# Patient Record
Sex: FEMALE | Race: WHITE | NOT HISPANIC OR LATINO | URBAN - METROPOLITAN AREA
[De-identification: names, ages, dates, MRNs, and addresses within clinical notes are randomized per-mention and may not be internally consistent; named-entity substitution may affect disease eponyms.]

---

## 2017-02-09 ENCOUNTER — IMPORTED ENCOUNTER (OUTPATIENT)
Dept: URBAN - METROPOLITAN AREA CLINIC 31 | Facility: CLINIC | Age: 67
End: 2017-02-09

## 2017-02-09 PROBLEM — H17.89: Noted: 2017-02-09

## 2017-02-09 PROBLEM — Z96.1: Noted: 2017-02-09

## 2017-02-09 PROBLEM — H16.223: Noted: 2017-02-09

## 2017-02-09 PROBLEM — H04.123: Noted: 2017-02-09

## 2017-02-09 PROBLEM — H35.3222: Noted: 2017-02-09

## 2017-02-09 PROBLEM — H26.491: Noted: 2017-02-09

## 2017-02-09 PROCEDURE — 92014 COMPRE OPH EXAM EST PT 1/>: CPT

## 2017-03-02 ENCOUNTER — IMPORTED ENCOUNTER (OUTPATIENT)
Dept: URBAN - METROPOLITAN AREA CLINIC 31 | Facility: CLINIC | Age: 67
End: 2017-03-02

## 2017-03-02 PROBLEM — H35.3222: Noted: 2017-03-02

## 2017-03-02 PROBLEM — H16.223: Noted: 2017-03-02

## 2017-03-02 PROBLEM — H26.491: Noted: 2017-03-02

## 2017-03-02 PROBLEM — Z96.1: Noted: 2017-03-02

## 2017-03-02 PROBLEM — H17.89: Noted: 2017-03-02

## 2017-03-02 PROCEDURE — 99213 OFFICE O/P EST LOW 20 MIN: CPT

## 2017-03-02 PROCEDURE — 92025 CPTRIZED CORNEAL TOPOGRAPHY: CPT

## 2017-03-28 ENCOUNTER — IMPORTED ENCOUNTER (OUTPATIENT)
Dept: URBAN - METROPOLITAN AREA CLINIC 31 | Facility: CLINIC | Age: 67
End: 2017-03-28

## 2017-03-28 PROBLEM — Z96.1: Noted: 2017-03-28

## 2017-03-28 PROBLEM — H17.89: Noted: 2017-03-28

## 2017-03-28 PROBLEM — H52.10: Noted: 2017-03-28

## 2017-03-28 PROBLEM — H35.3222: Noted: 2017-03-28

## 2017-03-28 PROBLEM — H35.3112: Noted: 2017-03-28

## 2017-04-21 ENCOUNTER — IMPORTED ENCOUNTER (OUTPATIENT)
Dept: URBAN - METROPOLITAN AREA CLINIC 31 | Facility: CLINIC | Age: 67
End: 2017-04-21

## 2017-04-21 PROCEDURE — 66999 UNLISTED PX ANT SEGMENT EYE: CPT

## 2017-04-22 ENCOUNTER — IMPORTED ENCOUNTER (OUTPATIENT)
Dept: URBAN - METROPOLITAN AREA CLINIC 31 | Facility: CLINIC | Age: 67
End: 2017-04-22

## 2017-04-22 PROBLEM — H52.10: Noted: 2017-04-22

## 2017-04-22 PROCEDURE — 99024 POSTOP FOLLOW-UP VISIT: CPT

## 2017-04-27 ENCOUNTER — IMPORTED ENCOUNTER (OUTPATIENT)
Dept: URBAN - METROPOLITAN AREA CLINIC 31 | Facility: CLINIC | Age: 67
End: 2017-04-27

## 2017-04-27 PROCEDURE — 99024 POSTOP FOLLOW-UP VISIT: CPT

## 2017-05-11 ENCOUNTER — IMPORTED ENCOUNTER (OUTPATIENT)
Dept: URBAN - METROPOLITAN AREA CLINIC 31 | Facility: CLINIC | Age: 67
End: 2017-05-11

## 2017-05-11 PROCEDURE — 99024 POSTOP FOLLOW-UP VISIT: CPT

## 2017-06-15 ENCOUNTER — IMPORTED ENCOUNTER (OUTPATIENT)
Dept: URBAN - METROPOLITAN AREA CLINIC 31 | Facility: CLINIC | Age: 67
End: 2017-06-15

## 2017-06-15 PROBLEM — Z96.1: Noted: 2017-06-15

## 2017-06-15 PROCEDURE — 99024 POSTOP FOLLOW-UP VISIT: CPT

## 2017-07-11 ENCOUNTER — FOLLOW UP (OUTPATIENT)
Dept: URBAN - METROPOLITAN AREA CLINIC 33 | Facility: CLINIC | Age: 67
End: 2017-07-11

## 2017-07-11 VITALS
HEART RATE: 78 BPM | HEIGHT: 61 IN | BODY MASS INDEX: 26.06 KG/M2 | WEIGHT: 138 LBS | DIASTOLIC BLOOD PRESSURE: 88 MMHG | SYSTOLIC BLOOD PRESSURE: 126 MMHG

## 2017-07-11 DIAGNOSIS — H35.3111: ICD-10-CM

## 2017-07-11 DIAGNOSIS — H43.813: ICD-10-CM

## 2017-07-11 DIAGNOSIS — H04.123: ICD-10-CM

## 2017-07-11 DIAGNOSIS — H35.3223: ICD-10-CM

## 2017-07-11 PROCEDURE — 92250 FUNDUS PHOTOGRAPHY W/I&R: CPT

## 2017-07-11 PROCEDURE — G8417 CALC BMI ABV UP PARAM F/U: HCPCS

## 2017-07-11 PROCEDURE — 2019F DILATED MACUL EXAM DONE: CPT

## 2017-07-11 PROCEDURE — 4177F TALK PT/CRGVR RE AREDS PREV: CPT

## 2017-07-11 PROCEDURE — 1036F TOBACCO NON-USER: CPT

## 2017-07-11 PROCEDURE — 92014 COMPRE OPH EXAM EST PT 1/>: CPT

## 2017-07-11 PROCEDURE — 92235 FLUORESCEIN ANGRPH MLTIFRAME: CPT

## 2017-07-11 PROCEDURE — G8427 DOCREV CUR MEDS BY ELIG CLIN: HCPCS

## 2017-07-11 ASSESSMENT — VISUAL ACUITY
OS_SC: 20/25+2
OD_SC: 20/40-2
OD_PH: 20/30+2

## 2017-07-11 ASSESSMENT — TONOMETRY
OD_IOP_MMHG: 12
OS_IOP_MMHG: 13

## 2017-07-20 ENCOUNTER — IMPORTED ENCOUNTER (OUTPATIENT)
Dept: URBAN - METROPOLITAN AREA CLINIC 31 | Facility: CLINIC | Age: 67
End: 2017-07-20

## 2017-07-20 PROCEDURE — 99024 POSTOP FOLLOW-UP VISIT: CPT

## 2017-09-25 NOTE — PATIENT DISCUSSION
Surgery  Counseling: I have discussed the option of glasses versus cataract surgery versus following. It was explained that when vision no longer meets the patient?s visual needs and a new prescription for glasses is not likely to improve the patient?s visual symptoms, the option of cataract surgery is a reasonable next step. It was explained that there is no guarantee that removing the cataract will improve their visual symptoms, however; it is believed that the cataract is contributing to the patient's visual impairment and surgery may significantly improve both the visual and functional status of the patient. The risks, benefits and alternatives of surgery were discussed with the patient. After this discussion, the patient desires to proceed with cataract surgery with implantation of an intraocular lens to improve vision drive safely at night with less glare, to read small print and doing most up close work.

## 2017-09-25 NOTE — PATIENT DISCUSSION
GLAUCOMA SUSPECT, OU ? OPTIC NERVE CUPPING/ASYMMETRY. IOP BORDERLINE, OU. OCT NEXT VISIT TO CHECK FOR RNFL THINNING. VISUAL FIELD NEXT VISIT TO CHECK FOR VISUAL FIELD LOSS.

## 2017-09-25 NOTE — PATIENT DISCUSSION
"Surgery Drops: I have given the patient the option to chose whether they would like a prescribed regimen of drops or an all-in-one drop for use before and after cataract surgery. They have elected to use the all-in-one option of Pred-Gati-Brom (prednisolone acetate, gatifloxacin and bromfenac). Patient to administer ""as directed"". "

## 2017-10-19 ENCOUNTER — IMPORTED ENCOUNTER (OUTPATIENT)
Dept: URBAN - METROPOLITAN AREA CLINIC 31 | Facility: CLINIC | Age: 67
End: 2017-10-19

## 2017-10-19 PROCEDURE — 99024 POSTOP FOLLOW-UP VISIT: CPT

## 2018-07-11 ENCOUNTER — FOLLOW UP (OUTPATIENT)
Dept: URBAN - METROPOLITAN AREA CLINIC 33 | Facility: CLINIC | Age: 68
End: 2018-07-11

## 2018-07-11 VITALS
HEART RATE: 64 BPM | SYSTOLIC BLOOD PRESSURE: 122 MMHG | DIASTOLIC BLOOD PRESSURE: 72 MMHG | BODY MASS INDEX: 27.09 KG/M2 | WEIGHT: 138 LBS | HEIGHT: 60 IN

## 2018-07-11 DIAGNOSIS — H43.813: ICD-10-CM

## 2018-07-11 DIAGNOSIS — H35.3223: ICD-10-CM

## 2018-07-11 DIAGNOSIS — H35.3111: ICD-10-CM

## 2018-07-11 PROCEDURE — 92250 FUNDUS PHOTOGRAPHY W/I&R: CPT

## 2018-07-11 PROCEDURE — 92014 COMPRE OPH EXAM EST PT 1/>: CPT

## 2018-07-11 PROCEDURE — 92235 FLUORESCEIN ANGRPH MLTIFRAME: CPT

## 2018-07-11 ASSESSMENT — VISUAL ACUITY
OD_SC: 20/30
OD_PH: 20/30+2
OS_SC: 20/25

## 2018-07-11 ASSESSMENT — TONOMETRY
OD_IOP_MMHG: 11
OS_IOP_MMHG: 10

## 2018-08-30 ENCOUNTER — IMPORTED ENCOUNTER (OUTPATIENT)
Dept: URBAN - METROPOLITAN AREA CLINIC 31 | Facility: CLINIC | Age: 68
End: 2018-08-30

## 2018-08-30 PROBLEM — Z96.1: Noted: 2018-08-30

## 2018-08-30 PROBLEM — H17.89: Noted: 2018-08-30

## 2018-08-30 PROBLEM — H43.812: Noted: 2018-08-30

## 2018-08-30 PROCEDURE — 92014 COMPRE OPH EXAM EST PT 1/>: CPT

## 2019-07-15 ENCOUNTER — FOLLOW UP (OUTPATIENT)
Dept: URBAN - METROPOLITAN AREA CLINIC 33 | Facility: CLINIC | Age: 69
End: 2019-07-15

## 2019-07-15 VITALS
BODY MASS INDEX: 26.06 KG/M2 | WEIGHT: 138 LBS | DIASTOLIC BLOOD PRESSURE: 80 MMHG | HEART RATE: 69 BPM | SYSTOLIC BLOOD PRESSURE: 129 MMHG | HEIGHT: 61 IN

## 2019-07-15 DIAGNOSIS — H35.3111: ICD-10-CM

## 2019-07-15 DIAGNOSIS — H43.813: ICD-10-CM

## 2019-07-15 DIAGNOSIS — H35.3223: ICD-10-CM

## 2019-07-15 PROCEDURE — 92250 FUNDUS PHOTOGRAPHY W/I&R: CPT

## 2019-07-15 PROCEDURE — 92235 FLUORESCEIN ANGRPH MLTIFRAME: CPT

## 2019-07-15 PROCEDURE — 92014 COMPRE OPH EXAM EST PT 1/>: CPT

## 2019-07-15 ASSESSMENT — TONOMETRY
OD_IOP_MMHG: 14
OS_IOP_MMHG: 12

## 2019-07-15 ASSESSMENT — VISUAL ACUITY
OD_SC: 20/20-1
OS_SC: 20/20-2

## 2019-07-25 ENCOUNTER — IMPORTED ENCOUNTER (OUTPATIENT)
Dept: URBAN - METROPOLITAN AREA CLINIC 31 | Facility: CLINIC | Age: 69
End: 2019-07-25

## 2019-07-25 PROBLEM — Z96.1: Noted: 2019-07-25

## 2019-07-25 PROBLEM — H35.3232: Noted: 2019-07-25

## 2019-07-25 PROCEDURE — 92014 COMPRE OPH EXAM EST PT 1/>: CPT

## 2019-10-15 NOTE — PATIENT DISCUSSION
Surgery Counseling: I have discussed the option of glasses versus cataract surgery versus following. It was explained that when the patients vision no longer meets their visual needs and a glasses prescription does not improve visual symptoms, the option of cataract surgery is a reasonable next step. It was explained that there is no guarantee that removing the cataract will improve their visual symptoms, however; it is believed that the cataract is contributing to the patient's visual impairment and surgery may improve both the visual and functional status of the patient. The risks, benefits and alternatives of surgery were discussed with the patient. After this discussion, the patient desires to proceed with cataract surgery with implantation of an intraocular lens to improve vision and reduce glare.

## 2019-10-15 NOTE — PATIENT DISCUSSION
"Multifocal, Trifocal and Extended-depth-of-focus lens (EDOF) - It was explained that multifocal/trifocal and extended depth of focus lenses split light and this can be associated with a decrease in contrast sensitivity, depth of focus, a double image and ghosting which may or may not resolve over time. Multifocal/trifocal lenses are lighting dependent. In low or dim lighting, glasses may be needed for optimal near vision. It was explained that rings, halos, starbursts or spider webs around point sources of light (headlights, tail lights, street lights, neon signs, the moon, stars, etc.) will be present indefinitely after multifocal/trifocal/EDOF lens implantation. While the majority of patients do not find this limit's their night time activities to include driving, in rare instances, it can.  It was explained that these lenses are designed to satisfy a defined area of near vision, or ""sweet spot""

## 2019-10-15 NOTE — PATIENT DISCUSSION
Refractive Error - a problem focusing light accurately onto the retina due to the shape of the eye. Presbyopia is a gradual, age-related loss of the eye's ability to focus actively on nearby objects. The most common types of refractive error are near-sightedness; far-sightedness, astigmatism, and presbyopia. I have discussed the options for refractive surgery to decrease dependency on glasses and/or contact lenses after surgery.

## 2019-10-15 NOTE — PATIENT DISCUSSION
**For patient's undergoing cataract surgery- In the event you decline a refractive package, traditional cataract surgery will be performed and a monofocal IOL will be implanted. Patient understands that they will need glasses for distance, intermediate and near vision.

## 2019-10-15 NOTE — PATIENT DISCUSSION
It was discussed that while the success rate of cataract surgery is high (success being defined as removing a lens and replacing it with an artificial lens) the success rate of getting the prescription corrected to meet or approximate the patients goals is lower than it is with LASIK (about 75-80%). These options include: correction of astigmatism with limbal relaxing incision(s), LenSx arcuate incision(s) and/or a toric IOL, monovision, nanovision, multifocal/trifocal IOL, and EDOF IOL. The patient was advised that if refractive cataract surgery does not meet their expectations and glasses provide an increased quality of vision, glasses would be prescribed and if the patient is a candidate, a LASIK touch up may be offered. However, if the lenses are limiting their vision or activities and the vision is neither correctable nor satisfactory with glasses, then an IOL exchange may be considered.

## 2019-10-15 NOTE — PATIENT DISCUSSION
I have discussed the options of a multifocal/trifocal and extended-depth-of-focus lens. It was explained that these options provide vision at either distance/intermediate or distance/near or distance/intermediate/near; however, compromises to achieve vision at varying distances do exist and these compromises may result in a reduced quality of vision. It was explained to the patient that the visual performance and dependence on glasses varies from patient to patient and while some may perform activities without glasses there are others who need glasses for most or all of their activities. It was emphasized to the patient that multifocal/trifocal and EDOF options do not eliminate the need for glasses at all times. It was emphasized to the patient that the goal of refractive cataract surgery is reducing spectacle dependence not complete spectacle freedom.

## 2019-10-30 NOTE — PATIENT DISCUSSION
***This patient had traditiona cataract surgery performed. A monofocal IOL was placed to achieve a target refraction of plano (which should provide them with satisfactory distance vision with the aid of glasses/contact lenses). ***

## 2020-07-13 ENCOUNTER — FOLLOW UP (OUTPATIENT)
Dept: URBAN - METROPOLITAN AREA CLINIC 33 | Facility: CLINIC | Age: 70
End: 2020-07-13

## 2020-07-13 VITALS
WEIGHT: 132 LBS | HEIGHT: 61 IN | SYSTOLIC BLOOD PRESSURE: 116 MMHG | HEART RATE: 68 BPM | DIASTOLIC BLOOD PRESSURE: 83 MMHG | BODY MASS INDEX: 24.92 KG/M2

## 2020-07-13 DIAGNOSIS — H35.3111: ICD-10-CM

## 2020-07-13 DIAGNOSIS — H35.3223: ICD-10-CM

## 2020-07-13 DIAGNOSIS — H43.813: ICD-10-CM

## 2020-07-13 PROCEDURE — 92235 FLUORESCEIN ANGRPH MLTIFRAME: CPT

## 2020-07-13 PROCEDURE — 92014 COMPRE OPH EXAM EST PT 1/>: CPT

## 2020-07-13 PROCEDURE — 92250 FUNDUS PHOTOGRAPHY W/I&R: CPT

## 2020-07-13 ASSESSMENT — TONOMETRY
OD_IOP_MMHG: 10
OS_IOP_MMHG: 12

## 2020-07-13 ASSESSMENT — VISUAL ACUITY
OS_SC: 20/20+1
OD_SC: 20/20

## 2020-12-03 ENCOUNTER — IMPORTED ENCOUNTER (OUTPATIENT)
Dept: URBAN - METROPOLITAN AREA CLINIC 31 | Facility: CLINIC | Age: 70
End: 2020-12-03

## 2020-12-03 PROBLEM — H04.123: Noted: 2020-12-03

## 2020-12-03 PROBLEM — H35.3222: Noted: 2020-12-03

## 2020-12-03 PROBLEM — Z96.1: Noted: 2020-12-03

## 2020-12-03 PROCEDURE — 92014 COMPRE OPH EXAM EST PT 1/>: CPT

## 2021-11-18 ENCOUNTER — IMPORTED ENCOUNTER (OUTPATIENT)
Dept: URBAN - METROPOLITAN AREA CLINIC 31 | Facility: CLINIC | Age: 71
End: 2021-11-18

## 2021-11-18 PROBLEM — Z96.1: Noted: 2021-11-18

## 2021-11-18 PROBLEM — H04.123: Noted: 2021-11-18

## 2021-11-18 PROBLEM — H35.3222: Noted: 2021-11-18

## 2021-11-18 PROCEDURE — 92014 COMPRE OPH EXAM EST PT 1/>: CPT

## 2022-01-12 ENCOUNTER — EMERGENCY VISIT (OUTPATIENT)
Dept: URBAN - METROPOLITAN AREA CLINIC 33 | Facility: CLINIC | Age: 72
End: 2022-01-12

## 2022-01-12 VITALS
HEART RATE: 67 BPM | WEIGHT: 135 LBS | SYSTOLIC BLOOD PRESSURE: 129 MMHG | BODY MASS INDEX: 26.5 KG/M2 | DIASTOLIC BLOOD PRESSURE: 74 MMHG | HEIGHT: 60 IN

## 2022-01-12 DIAGNOSIS — H43.813: ICD-10-CM

## 2022-01-12 DIAGNOSIS — H35.3111: ICD-10-CM

## 2022-01-12 DIAGNOSIS — H35.3223: ICD-10-CM

## 2022-01-12 DIAGNOSIS — H04.123: ICD-10-CM

## 2022-01-12 PROCEDURE — 92014 COMPRE OPH EXAM EST PT 1/>: CPT

## 2022-01-12 PROCEDURE — 92134 CPTRZ OPH DX IMG PST SGM RTA: CPT

## 2022-01-12 PROCEDURE — 92235 FLUORESCEIN ANGRPH MLTIFRAME: CPT

## 2022-01-12 ASSESSMENT — VISUAL ACUITY
OD_SC: 20/25
OS_SC: 20/20

## 2022-01-12 ASSESSMENT — TONOMETRY
OS_IOP_MMHG: 13
OD_IOP_MMHG: 12

## 2022-04-02 ASSESSMENT — TONOMETRY
OD_IOP_MMHG: 14
OD_IOP_MMHG: 13
OS_IOP_MMHG: 14
OS_IOP_MMHG: 12
OD_IOP_MMHG: 12
OS_IOP_MMHG: 15
OS_IOP_MMHG: 14
OD_IOP_MMHG: 15
OS_IOP_MMHG: 12
OD_IOP_MMHG: 14
OS_IOP_MMHG: 14
OD_IOP_MMHG: 14

## 2022-04-02 ASSESSMENT — VISUAL ACUITY
OS_CC: 20/25
OD_CC: 20/30-2
OS_SC: 20/20
OU_CC: 20/20
OD_CC: 20/50+2
OD_SC: 20/30+2
OD_CC: 20/20-2
OD_SC: 20/20
OU_CC: 20/20
OS_CC: 20/20-3
OD_CC: 20/30+3
OS_CC: 20/30+2
OD_CC: 20/30
OS_SC: 20/20-2
OD_PH: SC 20/50 +1
OD_CC: 20/25
OU_CC: 20/20
OD_CC: 20/20
OS_CC: 20/25-1
OS_CC: 20/25-2
OS_SC: 20/20
OS_CC: 20/15
OS_CC: 20/20-1
OS_CC: 20/20
OU_CC: 20/25
OU_SC: 20/20
OD_CC: 20/60+1
OD_SC: 20/20
OD_CC: 20/100
OS_CC: 20/25+3
OD_SC: 20/20-2
OS_SC: 20/20-2
OS_CC: 20/25-1
OS_CC: 20/25
OD_CC: 20/80-1
OD_SC: 20/20-2
OS_CC: 20/30+2
OS_SC: 20/30
OD_PH: SC 20/30
OU_SC: 20/20-2
OD_CC: 20/80-2
OD_CC: 20/30+2
OD_CC: 20/20
OD_CC: 20/20-2
OS_CC: 20/25+2

## 2022-04-02 ASSESSMENT — KERATOMETRY
OD_K2POWER_DIOPTERS: 45.00
OD_AXISANGLE_DEGREES: 155
OD_AXISANGLE2_DEGREES: 065
OD_K1POWER_DIOPTERS: 44.50

## 2022-06-04 ENCOUNTER — TELEPHONE ENCOUNTER (OUTPATIENT)
Dept: URBAN - METROPOLITAN AREA CLINIC 68 | Facility: CLINIC | Age: 72
End: 2022-06-04

## 2022-06-04 RX ORDER — DEXLANSOPRAZOLE 60 MG/1
CAPSULE, DELAYED RELEASE ORAL DAILY
Qty: 30 | Refills: 30 | OUTPATIENT
Start: 2017-01-25 | End: 2017-02-27

## 2022-06-05 ENCOUNTER — TELEPHONE ENCOUNTER (OUTPATIENT)
Dept: URBAN - METROPOLITAN AREA CLINIC 68 | Facility: CLINIC | Age: 72
End: 2022-06-05

## 2022-06-05 RX ORDER — CYCLOSPORINE 0.5 MG/ML
RESTASIS( 0.05% OPHTHALMIC  AS DIRECTED ) ACTIVE -HX ENTRY EMULSION OPHTHALMIC AS DIRECTED
Status: ACTIVE | COMMUNITY
Start: 2017-02-27

## 2022-06-05 RX ORDER — DEXLANSOPRAZOLE 60 MG/1
CAPSULE, DELAYED RELEASE ORAL DAILY
Qty: 0 | Refills: 0 | Status: ACTIVE | COMMUNITY
Start: 2017-01-26

## 2022-06-05 RX ORDER — ESZOPICLONE 3 MG/1
LUNESTA( 3MG ORAL  AT BEDTIME ) ACTIVE -HX ENTRY TABLET, COATED ORAL AT BEDTIME
Status: ACTIVE | COMMUNITY
Start: 2017-02-27

## 2022-06-05 RX ORDER — MIRABEGRON 50 MG/1
MYRBETRIQ( 50MG ORAL  DAILY ) ACTIVE -HX ENTRY TABLET, FILM COATED, EXTENDED RELEASE ORAL DAILY
Status: ACTIVE | COMMUNITY
Start: 2017-02-27

## 2022-06-05 RX ORDER — MELOXICAM 15 MG/1
MELOXICAM( 15MG ORAL  AS NEEDED ) ACTIVE -HX ENTRY TABLET ORAL AS NEEDED
Status: ACTIVE | COMMUNITY
Start: 2017-02-27

## 2022-06-05 RX ORDER — MULTIVIT-MIN/FOLIC/VIT K/LYCOP 400-300MCG
VITAMIN D3( 1000UNIT ORAL  DAILY ) ACTIVE -HX ENTRY TABLET ORAL DAILY
Status: ACTIVE | COMMUNITY
Start: 2017-02-27

## 2022-06-25 ENCOUNTER — TELEPHONE ENCOUNTER (OUTPATIENT)
Age: 72
End: 2022-06-25

## 2022-06-25 RX ORDER — DEXLANSOPRAZOLE 60 MG/1
CAPSULE, DELAYED RELEASE ORAL DAILY
Qty: 30 | Refills: 30 | OUTPATIENT
Start: 2017-01-25 | End: 2017-02-27

## 2022-06-26 ENCOUNTER — TELEPHONE ENCOUNTER (OUTPATIENT)
Age: 72
End: 2022-06-26

## 2022-06-26 RX ORDER — CYCLOSPORINE 0.5 MG/ML
RESTASIS( 0.05% OPHTHALMIC  AS DIRECTED ) ACTIVE -HX ENTRY EMULSION OPHTHALMIC AS DIRECTED
Status: ACTIVE | COMMUNITY
Start: 2017-02-27

## 2022-06-26 RX ORDER — MULTIVIT-MIN/FOLIC/VIT K/LYCOP 400-300MCG
VITAMIN D3( 1000UNIT ORAL  DAILY ) ACTIVE -HX ENTRY TABLET ORAL DAILY
Status: ACTIVE | COMMUNITY
Start: 2017-02-27

## 2022-06-26 RX ORDER — MELOXICAM 15 MG/1
MELOXICAM( 15MG ORAL  AS NEEDED ) ACTIVE -HX ENTRY TABLET ORAL AS NEEDED
Status: ACTIVE | COMMUNITY
Start: 2017-02-27

## 2022-06-26 RX ORDER — DEXLANSOPRAZOLE 60 MG/1
CAPSULE, DELAYED RELEASE ORAL DAILY
Qty: 0 | Refills: 0 | Status: ACTIVE | COMMUNITY
Start: 2017-01-26

## 2022-06-26 RX ORDER — CETIRIZINE HYDROCHLORIDE 10 MG/1
ZYRTEC( 10MG ORAL  DAILY ) ACTIVE -HX ENTRY TABLET, FILM COATED ORAL DAILY
Status: ACTIVE | COMMUNITY
Start: 2017-02-27

## 2022-06-26 RX ORDER — MIRABEGRON 50 MG/1
MYRBETRIQ( 50MG ORAL  DAILY ) ACTIVE -HX ENTRY TABLET, FILM COATED, EXTENDED RELEASE ORAL DAILY
Status: ACTIVE | COMMUNITY
Start: 2017-02-27

## 2022-06-26 RX ORDER — ESZOPICLONE 3 MG/1
LUNESTA( 3MG ORAL  AT BEDTIME ) ACTIVE -HX ENTRY TABLET, COATED ORAL AT BEDTIME
Status: ACTIVE | COMMUNITY
Start: 2017-02-27

## 2022-11-16 ENCOUNTER — PREPPED CHART (OUTPATIENT)
Dept: URBAN - METROPOLITAN AREA CLINIC 34 | Facility: CLINIC | Age: 72
End: 2022-11-16

## 2022-11-17 ENCOUNTER — COMPREHENSIVE EXAM (OUTPATIENT)
Dept: URBAN - METROPOLITAN AREA CLINIC 34 | Facility: CLINIC | Age: 72
End: 2022-11-17

## 2022-11-17 DIAGNOSIS — H02.831: ICD-10-CM

## 2022-11-17 DIAGNOSIS — H04.123: ICD-10-CM

## 2022-11-17 DIAGNOSIS — Z96.1: ICD-10-CM

## 2022-11-17 DIAGNOSIS — H35.3111: ICD-10-CM

## 2022-11-17 DIAGNOSIS — H43.813: ICD-10-CM

## 2022-11-17 DIAGNOSIS — H35.3223: ICD-10-CM

## 2022-11-17 PROCEDURE — 92014 COMPRE OPH EXAM EST PT 1/>: CPT

## 2022-11-17 ASSESSMENT — VISUAL ACUITY
OD_SC: 20/25-1
OS_SC: 20/20
OS_SC: 20/20-3
OD_SC: 20/20

## 2022-11-17 ASSESSMENT — TONOMETRY
OD_IOP_MMHG: 12
OS_IOP_MMHG: 14

## 2023-11-30 ENCOUNTER — COMPREHENSIVE EXAM (OUTPATIENT)
Dept: URBAN - METROPOLITAN AREA CLINIC 34 | Facility: CLINIC | Age: 73
End: 2023-11-30

## 2023-11-30 DIAGNOSIS — H35.3111: ICD-10-CM

## 2023-11-30 DIAGNOSIS — H04.123: ICD-10-CM

## 2023-11-30 DIAGNOSIS — H43.813: ICD-10-CM

## 2023-11-30 DIAGNOSIS — H02.831: ICD-10-CM

## 2023-11-30 DIAGNOSIS — H35.3223: ICD-10-CM

## 2023-11-30 DIAGNOSIS — Z96.1: ICD-10-CM

## 2023-11-30 PROCEDURE — 92134 CPTRZ OPH DX IMG PST SGM RTA: CPT

## 2023-11-30 PROCEDURE — 92014 COMPRE OPH EXAM EST PT 1/>: CPT

## 2023-11-30 RX ORDER — CYCLOSPORINE 0 MG/ML
1 SOLUTION/ DROPS OPHTHALMIC; TOPICAL TWICE A DAY
Start: 2023-11-30

## 2023-11-30 ASSESSMENT — TONOMETRY
OS_IOP_MMHG: 13
OD_IOP_MMHG: 11

## 2023-11-30 ASSESSMENT — VISUAL ACUITY
OD_SC: 20/30-2
OS_SC: 20/25-2
OD_SC: 20/20
OS_SC: 20/25-1

## 2024-05-30 ENCOUNTER — ESTABLISHED PATIENT (OUTPATIENT)
Dept: URBAN - METROPOLITAN AREA CLINIC 34 | Facility: CLINIC | Age: 74
End: 2024-05-30

## 2024-05-30 DIAGNOSIS — H35.3111: ICD-10-CM

## 2024-05-30 DIAGNOSIS — H35.3223: ICD-10-CM

## 2024-05-30 DIAGNOSIS — H04.123: ICD-10-CM

## 2024-05-30 PROCEDURE — 92134 CPTRZ OPH DX IMG PST SGM RTA: CPT

## 2024-05-30 PROCEDURE — 92014 COMPRE OPH EXAM EST PT 1/>: CPT

## 2024-05-30 ASSESSMENT — VISUAL ACUITY
OS_SC: 20/25-3
OD_SC: 20/40-2

## 2024-05-30 ASSESSMENT — TONOMETRY
OS_IOP_MMHG: 13
OD_IOP_MMHG: 12

## 2024-12-12 ENCOUNTER — FOLLOW UP (OUTPATIENT)
Age: 74
End: 2024-12-12

## 2024-12-12 DIAGNOSIS — H35.3111: ICD-10-CM

## 2024-12-12 DIAGNOSIS — H43.813: ICD-10-CM

## 2024-12-12 DIAGNOSIS — H04.123: ICD-10-CM

## 2024-12-12 DIAGNOSIS — H35.3223: ICD-10-CM

## 2024-12-12 PROCEDURE — 99214 OFFICE O/P EST MOD 30 MIN: CPT

## 2024-12-12 PROCEDURE — 92134 CPTRZ OPH DX IMG PST SGM RTA: CPT

## 2025-06-12 ENCOUNTER — COMPREHENSIVE EXAM (OUTPATIENT)
Age: 75
End: 2025-06-12

## 2025-06-12 DIAGNOSIS — H35.3223: ICD-10-CM

## 2025-06-12 DIAGNOSIS — H35.3111: ICD-10-CM

## 2025-06-12 DIAGNOSIS — Z96.1: ICD-10-CM

## 2025-06-12 DIAGNOSIS — H04.123: ICD-10-CM

## 2025-06-12 DIAGNOSIS — H43.813: ICD-10-CM

## 2025-06-12 PROCEDURE — 99214 OFFICE O/P EST MOD 30 MIN: CPT
